# Patient Record
Sex: MALE | Race: AMERICAN INDIAN OR ALASKA NATIVE | NOT HISPANIC OR LATINO | URBAN - METROPOLITAN AREA
[De-identification: names, ages, dates, MRNs, and addresses within clinical notes are randomized per-mention and may not be internally consistent; named-entity substitution may affect disease eponyms.]

---

## 2019-10-16 ENCOUNTER — OUTPATIENT (OUTPATIENT)
Dept: OUTPATIENT SERVICES | Facility: HOSPITAL | Age: 44
LOS: 1 days | Discharge: HOME | End: 2019-10-16
Payer: COMMERCIAL

## 2019-10-16 DIAGNOSIS — M25.559 PAIN IN UNSPECIFIED HIP: ICD-10-CM

## 2019-10-16 DIAGNOSIS — R35.0 FREQUENCY OF MICTURITION: ICD-10-CM

## 2019-10-16 DIAGNOSIS — M54.5 LOW BACK PAIN: ICD-10-CM

## 2019-10-16 PROCEDURE — 76857 US EXAM PELVIC LIMITED: CPT | Mod: 26

## 2019-10-16 PROCEDURE — 73522 X-RAY EXAM HIPS BI 3-4 VIEWS: CPT | Mod: 26

## 2019-10-16 PROCEDURE — 72110 X-RAY EXAM L-2 SPINE 4/>VWS: CPT | Mod: 26

## 2022-11-17 ENCOUNTER — APPOINTMENT (OUTPATIENT)
Dept: PAIN MANAGEMENT | Facility: CLINIC | Age: 47
End: 2022-11-17

## 2022-11-17 VITALS — SYSTOLIC BLOOD PRESSURE: 133 MMHG | HEART RATE: 63 BPM | DIASTOLIC BLOOD PRESSURE: 83 MMHG

## 2022-11-17 DIAGNOSIS — Z78.9 OTHER SPECIFIED HEALTH STATUS: ICD-10-CM

## 2022-11-17 DIAGNOSIS — M54.59 OTHER LOW BACK PAIN: ICD-10-CM

## 2022-11-17 DIAGNOSIS — Z87.39 PERSONAL HISTORY OF OTHER DISEASES OF THE MUSCULOSKELETAL SYSTEM AND CONNECTIVE TISSUE: ICD-10-CM

## 2022-11-17 PROBLEM — Z00.00 ENCOUNTER FOR PREVENTIVE HEALTH EXAMINATION: Status: ACTIVE | Noted: 2022-11-17

## 2022-11-17 PROCEDURE — 99204 OFFICE O/P NEW MOD 45 MIN: CPT

## 2022-11-17 PROCEDURE — 99072 ADDL SUPL MATRL&STAF TM PHE: CPT

## 2022-11-20 PROBLEM — Z78.9 NON-SMOKER: Status: ACTIVE | Noted: 2022-11-17

## 2022-11-20 PROBLEM — Z87.39 HISTORY OF ARTHRITIS: Status: RESOLVED | Noted: 2022-11-17 | Resolved: 2022-11-20

## 2022-11-20 PROBLEM — Z78.9 SOCIAL ALCOHOL USE: Status: ACTIVE | Noted: 2022-11-17

## 2022-11-20 NOTE — PHYSICAL EXAM
[de-identified] : Lumbar Spine Exam:\par Inspection:\par erythema (-)\par ecchymosis (-)\par rashes (-)\par alignment: no scoliosis\par \par Palpation:\par Midline lumbar tenderness:             (-)\par paraspinal tenderness:                  L (-) ; R (-)\par sciatic nerve tenderness :             L (-) ; R (-)\par SI joint tenderness:                        L (-) ; R (-)\par GTB tenderness:                            L (-);  R (-)\par \par Limited ROM 2/2 to pain in lumbar flexion\par \par Strength:\par 5/5 throughout all muscle groups of the lower extremity\par \par                                    Right       Left   \par Hip Flexion:                (5/5)       (5/5)\par Quadriceps:               (5/5)       (5/5)\par Hamstrings:                (5/5)       (5/5)\par Ankle Dorsiflexion:     (5/5)       (5/5)\par EHL:                           (5/5)       (5/5)\par Ankle Plantarflexion:  (5/5)       (5/5)\par \par Special Tests:\par SLR:                           R (-) ; L (-)\par Facet loading:            R (-) ; L (-)\par DALLAS test:               R (-) ; L (-)\par \par Neurologic:\par Light touch intact throughout LE \par Reflexes normal and symmetric \par \par Gait:\par non- antalgic gait\par ambulates w/o assistive device

## 2022-11-20 NOTE — REASON FOR VISIT
[Initial Visit] : an initial pain management visit [FreeTextEntry2] : evaluation back and hand pain

## 2022-11-20 NOTE — DISCUSSION/SUMMARY
[de-identified] : Plan\par \par Physical therapy 2-3x/week for 6 weeks\par \par I advised SHARONA that the ibuprofen 800 should be taken with food.  In addition while taking the prescribed NSAID, no over the counter or other NSAIDs should be used, such as ibuprofen (Motrin or Advil) or naproxen (Aleve) as this can cause stomach upset or other side effects.  If needed for fever or breakthrough pain Tylenol can be used. \par \par MRI lumbar spine w/o contrast

## 2022-11-20 NOTE — HISTORY OF PRESENT ILLNESS
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 The pain was had after pulling a very large palate of IT equipment. The patient was flexed in the lumbar spine and pulling this heavy item where he felt this pain in his low back. Since that injury day, the pain has been persistent and very severe. Pain is constant throughout the day. Made worse by prolonged sitting and can be relieved by standing with erect posture / lumbar extension. He has tried tylenol and some nsaids OTC. Pain is a 8/10 on the pain scale.

## 2022-12-29 ENCOUNTER — APPOINTMENT (OUTPATIENT)
Dept: PAIN MANAGEMENT | Facility: CLINIC | Age: 47
End: 2022-12-29
Payer: OTHER GOVERNMENT

## 2022-12-29 VITALS — WEIGHT: 182 LBS | BODY MASS INDEX: 27.58 KG/M2 | HEIGHT: 68 IN

## 2022-12-29 PROCEDURE — 99214 OFFICE O/P EST MOD 30 MIN: CPT

## 2022-12-29 PROCEDURE — 99072 ADDL SUPL MATRL&STAF TM PHE: CPT

## 2022-12-29 RX ORDER — IBUPROFEN 800 MG/1
800 TABLET, FILM COATED ORAL EVERY 8 HOURS
Qty: 90 | Refills: 0 | Status: ACTIVE | COMMUNITY
Start: 2022-11-17 | End: 1900-01-01

## 2023-01-03 NOTE — HISTORY OF PRESENT ILLNESS
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 The pain was had after pulling a very large palate of IT equipment. The patient was flexed in the lumbar spine and pulling this heavy item where he felt this pain in his low back. Since that injury day, the pain has been persistent and very severe. Pain is constant throughout the day. Made worse by prolonged sitting and can be relieved by standing with erect posture / lumbar extension. He has tried tylenol and some nsaids OTC. Pain is a 8/10 on the pain scale. \par \par 12/29\par Patient returns for follow up visit. He is mildly improved since last visit. He has been doing physical therapy, nsaid therapy with mild to moderate results. He is slowly improving in time. He still experiences low back pain at the midline and extends/refers to the bilateral lumbar spine. Since that injury day, the pain has been persistent. He notices worse pain with prolonged sitting and bending forward, with some relief standing and extending backward. Pain is 7/10 today.

## 2023-01-03 NOTE — DISCUSSION/SUMMARY
[de-identified] : Plan\par 1. Continue physical therapy\par 2. Continue NSAID therapy\par 3. If the pain persists  and is limiting functional capacity / quality of life - then the patient is a good candidate for lumbar epidural steroid injection. \par \par f/u in 4-6 weeks

## 2023-01-03 NOTE — PHYSICAL EXAM
[de-identified] : Lumbar Spine Exam:\par Inspection:\par erythema (-)\par ecchymosis (-)\par rashes (-)\par alignment: no scoliosis\par \par Palpation:\par Midline lumbar tenderness:             (-)\par paraspinal tenderness:                  L (-) ; R (-)\par sciatic nerve tenderness :             L (-) ; R (-)\par SI joint tenderness:                        L (-) ; R (-)\par GTB tenderness:                            L (-);  R (-)\par \par Limited ROM 2/2 to pain in lumbar flexion\par \par Strength:\par 5/5 throughout all muscle groups of the lower extremity\par \par                                    Right       Left   \par Hip Flexion:                (5/5)       (5/5)\par Quadriceps:               (5/5)       (5/5)\par Hamstrings:                (5/5)       (5/5)\par Ankle Dorsiflexion:     (5/5)       (5/5)\par EHL:                           (5/5)       (5/5)\par Ankle Plantarflexion:  (5/5)       (5/5)\par \par Special Tests:\par SLR:                           R (-) ; L (-)\par Facet loading:            R (-) ; L (-)\par DALLAS test:               R (-) ; L (-)\par \par Neurologic:\par Light touch intact throughout LE \par Reflexes normal and symmetric \par \par Gait:\par non- antalgic gait\par ambulates w/o assistive device

## 2023-01-26 ENCOUNTER — APPOINTMENT (OUTPATIENT)
Dept: PAIN MANAGEMENT | Facility: CLINIC | Age: 48
End: 2023-01-26
Payer: COMMERCIAL

## 2023-01-26 PROCEDURE — 99213 OFFICE O/P EST LOW 20 MIN: CPT

## 2023-01-26 PROCEDURE — 99072 ADDL SUPL MATRL&STAF TM PHE: CPT

## 2023-01-27 NOTE — DISCUSSION/SUMMARY
[de-identified] : Recommendations\par 1. Continue PT / HEP\par 2. continue nsaids\par I advised SHARONA that the NSAID should be taken with food.  In addition while taking the prescribed NSAID, no over the counter or other NSAIDs should be used, such as ibuprofen (Motrin or Advil) or naproxen (Aleve) as this can cause stomach upset or other side effects.  If needed for fever or breakthrough pain Tylenol can be used.\par \par f/u in 6 weeks for follow up\par \par Patient is working. He will f/u in 6 weeks. We discussed a lumbar epidural steroid injection if his pain is persistent/worse and limiting overall functionality.

## 2023-01-27 NOTE — PHYSICAL EXAM
[de-identified] : Lumbar Spine Exam:\par Inspection:\par erythema (-)\par ecchymosis (-)\par rashes (-)\par alignment: no scoliosis\par \par Palpation:\par Midline lumbar tenderness:             (-)\par paraspinal tenderness:                  L (-) ; R (-)\par sciatic nerve tenderness :             L (-) ; R (-)\par SI joint tenderness:                        L (-) ; R (-)\par GTB tenderness:                            L (-);  R (-)\par \par Limited ROM 2/2 to pain in lumbar flexion\par \par Strength:\par 5/5 throughout all muscle groups of the lower extremity\par \par                                    Right       Left   \par Hip Flexion:                (5/5)       (5/5)\par Quadriceps:               (5/5)       (5/5)\par Hamstrings:                (5/5)       (5/5)\par Ankle Dorsiflexion:     (5/5)       (5/5)\par EHL:                           (5/5)       (5/5)\par Ankle Plantarflexion:  (5/5)       (5/5)\par \par Special Tests:\par SLR:                           R (-) ; L (+)\par Facet loading:            R (-) ; L (-)\par DALLAS test:               R (-) ; L (-)\par \par Neurologic:\par Light touch intact throughout LE \par Reflexes normal and symmetric \par \par Gait:\par non- antalgic gait\par ambulates w/o assistive device

## 2023-01-27 NOTE — HISTORY OF PRESENT ILLNESS
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 The pain was had after pulling a very large palate of IT equipment. The patient was flexed in the lumbar spine and pulling this heavy item where he felt this pain in his low back. Since that injury day, the pain has been persistent and very severe. Pain is constant throughout the day. Made worse by prolonged sitting and can be relieved by standing with erect posture / lumbar extension. He has tried tylenol and some nsaids OTC. Pain is a 8/10 on the pain scale. \par \par 12/29\par Patient returns for follow up visit. He is mildly improved since last visit. He has been doing physical therapy, nsaid therapy with mild to moderate results. He is slowly improving in time. He still experiences low back pain at the midline and extends/refers to the bilateral lumbar spine. Since that injury day, the pain has been persistent. He notices worse pain with prolonged sitting and bending forward, with some relief standing and extending backward. Pain is 7/10 today.\par \par 1/26/23\par Dariel presents for follow up. He continues to make progress. He is undergoing physical thearpy, nsaid use with moderate results. He still experiences pain in the low back midline and refers to b/l paraspinal muscles and with sitting/bending forward feels worse radicular pain down the left leg especially and worse back pain. Pain today is 6/10.

## 2023-04-27 ENCOUNTER — APPOINTMENT (OUTPATIENT)
Dept: PAIN MANAGEMENT | Facility: CLINIC | Age: 48
End: 2023-04-27